# Patient Record
Sex: MALE | Race: WHITE
[De-identification: names, ages, dates, MRNs, and addresses within clinical notes are randomized per-mention and may not be internally consistent; named-entity substitution may affect disease eponyms.]

---

## 2018-05-21 ENCOUNTER — HOSPITAL ENCOUNTER (OUTPATIENT)
Dept: HOSPITAL 11 - JP.SDS | Age: 73
Discharge: HOME | End: 2018-05-21
Attending: SURGERY
Payer: MEDICARE

## 2018-05-21 VITALS — SYSTOLIC BLOOD PRESSURE: 130 MMHG | DIASTOLIC BLOOD PRESSURE: 56 MMHG

## 2018-05-21 DIAGNOSIS — K62.1: ICD-10-CM

## 2018-05-21 DIAGNOSIS — I10: ICD-10-CM

## 2018-05-21 DIAGNOSIS — Z86.010: ICD-10-CM

## 2018-05-21 DIAGNOSIS — G47.33: ICD-10-CM

## 2018-05-21 DIAGNOSIS — Z87.891: ICD-10-CM

## 2018-05-21 DIAGNOSIS — K21.9: ICD-10-CM

## 2018-05-21 DIAGNOSIS — K57.30: ICD-10-CM

## 2018-05-21 DIAGNOSIS — Z12.11: Primary | ICD-10-CM

## 2018-05-21 PROCEDURE — 45380 COLONOSCOPY AND BIOPSY: CPT

## 2018-09-28 ENCOUNTER — APPOINTMENT (OUTPATIENT)
Age: 73
Setting detail: DERMATOLOGY
End: 2018-09-28

## 2018-09-28 PROBLEM — C44.91 BASAL CELL CARCINOMA OF SKIN, UNSPECIFIED: Status: ACTIVE | Noted: 2018-09-28

## 2018-09-28 PROCEDURE — OTHER MOHS SURGERY PHONE CONSULTATION: OTHER

## 2018-09-28 NOTE — PROCEDURE: MOHS SURGERY PHONE CONSULTATION
Office Location Of Mohs Surgery: [facility]
Does The Patient Have A Pacemaker Or Defibrillator?: No
Referring Provider: Tennille Toth PA-C
Detail Level: Detailed
Has The Pathology Report Been Received?: Yes
Patient Reported Location: crease between lip and nose
Which Antibiotic Do They Take For Surgical Prophylaxis?: Amoxicillin (2 grams)
Date Of Surgical Consultation If Needed: 09/28/2018
Date Of Mohs Surgery: 10/02/2018

## 2018-10-02 ENCOUNTER — APPOINTMENT (OUTPATIENT)
Age: 73
Setting detail: DERMATOLOGY
End: 2018-10-07

## 2018-10-02 DIAGNOSIS — Z71.89 OTHER SPECIFIED COUNSELING: ICD-10-CM

## 2018-10-02 PROBLEM — C44.319 BASAL CELL CARCINOMA OF SKIN OF OTHER PARTS OF FACE: Status: ACTIVE | Noted: 2018-10-02

## 2018-10-02 PROCEDURE — 17311 MOHS 1 STAGE H/N/HF/G: CPT

## 2018-10-02 PROCEDURE — OTHER CONSULTATION FOR MOHS SURGERY: OTHER

## 2018-10-02 PROCEDURE — OTHER MIPS QUALITY: OTHER

## 2018-10-02 PROCEDURE — OTHER COUNSELING: OTHER

## 2018-10-02 PROCEDURE — OTHER MOHS SURGERY: OTHER

## 2018-10-02 PROCEDURE — 14061 TIS TRNFR E/N/E/L10.1-30SQCM: CPT

## 2018-10-02 PROCEDURE — OTHER RETURN TO REFERRING PROVIDER: OTHER

## 2018-10-02 NOTE — PROCEDURE: MOHS SURGERY
Referring Physician (Optional): Tennille oTth PA-C Referring Physician (Optional): Tennille Toth PA-C

## 2018-10-02 NOTE — PROCEDURE: CONSULTATION FOR MOHS SURGERY
Incorporate Mauc In Note: No
Name Of The Referring Provider For Procedure: Tennille Toth PA-C
X Size Of Lesion In Cm (Optional): 0.5
Size Of Lesion: 0.7
Detail Level: Detailed
Body Location Override (Optional - Billing Will Still Be Based On Selected Body Map Location If Applicable): Right Nasolabial Fold

## 2018-10-02 NOTE — PROCEDURE: MOHS SURGERY
Post-Care Instructions: The patient was provided with detailed verbal and written instructions for daily wound care, including use of H2O2 cleansing, followed by application of plain Vaseline and a bandage.  These instructions including Dr. Silva's contact information should there be any questions or concerns.  The patient is not to engage in any heavy lifting, exercise, or swimming for the next week.  Should the patient develop any fevers, chills, bleeding, or pain not controlled by OTC analgesics, s/he should contact the office immediately.

## 2018-10-02 NOTE — PROCEDURE: MIPS QUALITY
Detail Level: Detailed
Quality 130: Documentation Of Current Medications In The Medical Record: Current Medications Documented
Quality 143: Oncology: Medical And Radiation- Pain Intensity Quantified: Pain severity quantified, no pain present
Quality 110: Preventive Care And Screening: Influenza Immunization: Influenza Immunization previously received during influenza season
Quality 226: Preventive Care And Screening: Tobacco Use: Screening And Cessation Intervention: Patient screened for tobacco and is an ex-smoker
Quality 431: Preventive Care And Screening: Unhealthy Alcohol Use - Screening: Patient screened for unhealthy alcohol use using a single question and scores less than 2 times per year

## 2022-02-09 NOTE — PROCEDURE: MOHS SURGERY
Addended by: AB CELIS on: 2/8/2022 04:14 PM     Modules accepted: Orders     Home Suture Removal Text: Patient was provided instructions on removing sutures and will remove their sutures at home.  If they have any questions or difficulties they will call the office.

## 2023-06-19 ENCOUNTER — HOSPITAL ENCOUNTER (OUTPATIENT)
Dept: HOSPITAL 11 - JP.SDS | Age: 78
Discharge: HOME | End: 2023-06-19
Attending: STUDENT IN AN ORGANIZED HEALTH CARE EDUCATION/TRAINING PROGRAM
Payer: MEDICARE

## 2023-06-19 VITALS — HEART RATE: 87 BPM | SYSTOLIC BLOOD PRESSURE: 133 MMHG | DIASTOLIC BLOOD PRESSURE: 91 MMHG

## 2023-06-19 DIAGNOSIS — K44.9: ICD-10-CM

## 2023-06-19 DIAGNOSIS — K21.9: ICD-10-CM

## 2023-06-19 DIAGNOSIS — K29.50: ICD-10-CM

## 2023-06-19 DIAGNOSIS — G47.33: ICD-10-CM

## 2023-06-19 DIAGNOSIS — Z12.11: Primary | ICD-10-CM

## 2023-06-19 DIAGNOSIS — Z79.01: ICD-10-CM

## 2023-06-19 DIAGNOSIS — I48.91: ICD-10-CM

## 2023-06-19 DIAGNOSIS — Z79.899: ICD-10-CM

## 2023-06-19 DIAGNOSIS — K57.30: ICD-10-CM

## 2023-06-19 DIAGNOSIS — I10: ICD-10-CM

## 2023-06-19 PROCEDURE — 93005 ELECTROCARDIOGRAM TRACING: CPT

## 2023-06-19 PROCEDURE — 88342 IMHCHEM/IMCYTCHM 1ST ANTB: CPT

## 2023-06-19 PROCEDURE — 43239 EGD BIOPSY SINGLE/MULTIPLE: CPT

## 2023-06-19 PROCEDURE — 88305 TISSUE EXAM BY PATHOLOGIST: CPT

## 2024-07-27 ENCOUNTER — HOSPITAL ENCOUNTER (EMERGENCY)
Dept: HOSPITAL 11 - JP.ED | Age: 79
Discharge: HOME | End: 2024-07-27
Payer: MEDICARE

## 2024-07-27 VITALS — DIASTOLIC BLOOD PRESSURE: 72 MMHG | HEART RATE: 110 BPM | SYSTOLIC BLOOD PRESSURE: 156 MMHG

## 2024-07-27 DIAGNOSIS — Z90.49: ICD-10-CM

## 2024-07-27 DIAGNOSIS — Z79.899: ICD-10-CM

## 2024-07-27 DIAGNOSIS — I10: ICD-10-CM

## 2024-07-27 DIAGNOSIS — U07.1: Primary | ICD-10-CM

## 2024-07-27 DIAGNOSIS — Z95.0: ICD-10-CM

## 2024-09-18 NOTE — PROCEDURE: MOHS SURGERY
Called parent no answer and left a voicemail confirming the in-person appt on 9/24/2024 with Jonathan Carl NP.      Postop Diagnosis: same